# Patient Record
Sex: FEMALE | Race: BLACK OR AFRICAN AMERICAN | NOT HISPANIC OR LATINO
[De-identification: names, ages, dates, MRNs, and addresses within clinical notes are randomized per-mention and may not be internally consistent; named-entity substitution may affect disease eponyms.]

---

## 2018-05-28 ENCOUNTER — TRANSCRIPTION ENCOUNTER (OUTPATIENT)
Age: 23
End: 2018-05-28

## 2019-06-13 ENCOUNTER — APPOINTMENT (OUTPATIENT)
Dept: OBGYN | Facility: CLINIC | Age: 24
End: 2019-06-13

## 2019-12-18 ENCOUNTER — TRANSCRIPTION ENCOUNTER (OUTPATIENT)
Age: 24
End: 2019-12-18

## 2020-01-07 ENCOUNTER — TRANSCRIPTION ENCOUNTER (OUTPATIENT)
Age: 25
End: 2020-01-07

## 2021-03-28 ENCOUNTER — TRANSCRIPTION ENCOUNTER (OUTPATIENT)
Age: 26
End: 2021-03-28

## 2021-04-22 ENCOUNTER — TRANSCRIPTION ENCOUNTER (OUTPATIENT)
Age: 26
End: 2021-04-22

## 2021-07-30 ENCOUNTER — TRANSCRIPTION ENCOUNTER (OUTPATIENT)
Age: 26
End: 2021-07-30

## 2021-09-05 ENCOUNTER — TRANSCRIPTION ENCOUNTER (OUTPATIENT)
Age: 26
End: 2021-09-05

## 2021-11-04 ENCOUNTER — ASOB RESULT (OUTPATIENT)
Age: 26
End: 2021-11-04

## 2021-11-04 ENCOUNTER — APPOINTMENT (OUTPATIENT)
Dept: OBGYN | Facility: CLINIC | Age: 26
End: 2021-11-04
Payer: COMMERCIAL

## 2021-11-04 VITALS
DIASTOLIC BLOOD PRESSURE: 85 MMHG | SYSTOLIC BLOOD PRESSURE: 143 MMHG | WEIGHT: 130.44 LBS | HEIGHT: 58 IN | BODY MASS INDEX: 27.38 KG/M2 | HEART RATE: 86 BPM

## 2021-11-04 VITALS — SYSTOLIC BLOOD PRESSURE: 129 MMHG | DIASTOLIC BLOOD PRESSURE: 85 MMHG

## 2021-11-04 DIAGNOSIS — Z78.9 OTHER SPECIFIED HEALTH STATUS: ICD-10-CM

## 2021-11-04 DIAGNOSIS — Z87.898 PERSONAL HISTORY OF OTHER SPECIFIED CONDITIONS: ICD-10-CM

## 2021-11-04 DIAGNOSIS — Z83.3 FAMILY HISTORY OF DIABETES MELLITUS: ICD-10-CM

## 2021-11-04 PROCEDURE — 99203 OFFICE O/P NEW LOW 30 MIN: CPT

## 2021-11-04 PROCEDURE — 76817 TRANSVAGINAL US OBSTETRIC: CPT

## 2021-11-07 PROBLEM — Z87.898 HISTORY OF MARIJUANA USE: Status: ACTIVE | Noted: 2021-11-07

## 2021-11-07 PROBLEM — Z78.9 DOES NOT USE ILLICIT DRUGS: Status: ACTIVE | Noted: 2021-11-07

## 2021-11-07 PROBLEM — Z78.9 SOCIAL ALCOHOL USE: Status: ACTIVE | Noted: 2021-11-07

## 2021-11-07 PROBLEM — Z83.3 FAMILY HISTORY OF DIABETES MELLITUS: Status: ACTIVE | Noted: 2021-11-07

## 2021-11-07 LAB
ABO + RH PNL BLD: NORMAL
B19V IGG SER QL IA: 6.25 INDEX
B19V IGG+IGM SER-IMP: NORMAL
B19V IGG+IGM SER-IMP: POSITIVE
B19V IGM FLD-ACNC: 0.08 INDEX
B19V IGM SER-ACNC: NEGATIVE
BACTERIA UR CULT: NORMAL
BASOPHILS # BLD AUTO: 0.03 K/UL
BASOPHILS NFR BLD AUTO: 0.3 %
BLD GP AB SCN SERPL QL: NORMAL
C TRACH RRNA SPEC QL NAA+PROBE: NOT DETECTED
CMV IGG SERPL QL: <0.2 U/ML
CMV IGG SERPL-IMP: NEGATIVE
CMV IGM SERPL QL: <8 AU/ML
CMV IGM SERPL QL: NEGATIVE
EOSINOPHIL # BLD AUTO: 0.09 K/UL
EOSINOPHIL NFR BLD AUTO: 0.8 %
HBV SURFACE AG SER QL: NONREACTIVE
HCT VFR BLD CALC: 38.2 %
HCV AB SER QL: NONREACTIVE
HCV S/CO RATIO: 0.11 S/CO
HGB A MFR BLD: 97.2 %
HGB A2 MFR BLD: 2.8 %
HGB BLD-MCNC: 12.6 G/DL
HGB FRACT BLD-IMP: NORMAL
HIV1+2 AB SPEC QL IA.RAPID: NONREACTIVE
IMM GRANULOCYTES NFR BLD AUTO: 0.3 %
LEAD BLD-MCNC: <1 UG/DL
LYMPHOCYTES # BLD AUTO: 1.93 K/UL
LYMPHOCYTES NFR BLD AUTO: 17.5 %
MAN DIFF?: NORMAL
MCHC RBC-ENTMCNC: 30.6 PG
MCHC RBC-ENTMCNC: 33 GM/DL
MCV RBC AUTO: 92.7 FL
MONOCYTES # BLD AUTO: 0.67 K/UL
MONOCYTES NFR BLD AUTO: 6.1 %
N GONORRHOEA RRNA SPEC QL NAA+PROBE: NOT DETECTED
NEUTROPHILS # BLD AUTO: 8.31 K/UL
NEUTROPHILS NFR BLD AUTO: 75 %
PLATELET # BLD AUTO: 344 K/UL
RBC # BLD: 4.12 M/UL
RBC # FLD: 12.4 %
RUBV IGG FLD-ACNC: 7.2 INDEX
RUBV IGG SER-IMP: POSITIVE
SOURCE AMPLIFICATION: NORMAL
T PALLIDUM AB SER QL IA: NEGATIVE
VZV AB TITR SER: POSITIVE
VZV IGG SER IF-ACNC: 1107 INDEX
WBC # FLD AUTO: 11.06 K/UL

## 2021-11-07 RX ORDER — PNV NO.95/FERROUS FUM/FOLIC AC 28MG-0.8MG
TABLET ORAL
Refills: 0 | Status: ACTIVE | COMMUNITY

## 2021-11-15 LAB
AR GENE MUT ANL BLD/T: NORMAL
CFTR MUT TESTED BLD/T: NEGATIVE
FMR1 GENE MUT ANL BLD/T: NORMAL

## 2021-12-10 ENCOUNTER — APPOINTMENT (OUTPATIENT)
Dept: ANTEPARTUM | Facility: CLINIC | Age: 26
End: 2021-12-10
Payer: COMMERCIAL

## 2021-12-10 ENCOUNTER — APPOINTMENT (OUTPATIENT)
Dept: OBGYN | Facility: CLINIC | Age: 26
End: 2021-12-10

## 2021-12-10 ENCOUNTER — ASOB RESULT (OUTPATIENT)
Age: 26
End: 2021-12-10

## 2021-12-10 ENCOUNTER — LABORATORY RESULT (OUTPATIENT)
Age: 26
End: 2021-12-10

## 2021-12-10 VITALS
WEIGHT: 125 LBS | TEMPERATURE: 97.1 F | HEIGHT: 58 IN | HEART RATE: 85 BPM | DIASTOLIC BLOOD PRESSURE: 79 MMHG | SYSTOLIC BLOOD PRESSURE: 125 MMHG | BODY MASS INDEX: 26.24 KG/M2

## 2021-12-10 PROCEDURE — 76813 OB US NUCHAL MEAS 1 GEST: CPT

## 2021-12-10 PROCEDURE — 76801 OB US < 14 WKS SINGLE FETUS: CPT

## 2021-12-10 PROCEDURE — 36416 COLLJ CAPILLARY BLOOD SPEC: CPT

## 2022-01-13 ENCOUNTER — APPOINTMENT (OUTPATIENT)
Dept: OBGYN | Facility: CLINIC | Age: 27
End: 2022-01-13

## 2022-01-13 VITALS
HEIGHT: 58 IN | WEIGHT: 127 LBS | HEART RATE: 93 BPM | DIASTOLIC BLOOD PRESSURE: 79 MMHG | SYSTOLIC BLOOD PRESSURE: 119 MMHG | BODY MASS INDEX: 26.66 KG/M2 | TEMPERATURE: 97.5 F

## 2022-01-23 LAB
1ST TRIMESTER DATA: NORMAL
2ND TRIMESTER DATA: NORMAL
AFP PNL SERPL: NORMAL
AFP SERPL-ACNC: NORMAL
AFP SERPL-ACNC: NORMAL
B-HCG FREE SERPL-MCNC: NORMAL
CLINICAL BIOCHEMIST REVIEW: NORMAL
FREE BETA HCG 1ST TRIMESTER: NORMAL
INHIBIN A SERPL-MCNC: NORMAL
NOTES NTD: NORMAL
NT: NORMAL
PAPP-A SERPL-ACNC: NORMAL
U ESTRIOL SERPL-SCNC: NORMAL

## 2022-02-10 ENCOUNTER — APPOINTMENT (OUTPATIENT)
Dept: ANTEPARTUM | Facility: CLINIC | Age: 27
End: 2022-02-10
Payer: COMMERCIAL

## 2022-02-10 ENCOUNTER — ASOB RESULT (OUTPATIENT)
Age: 27
End: 2022-02-10

## 2022-02-10 PROCEDURE — 76811 OB US DETAILED SNGL FETUS: CPT

## 2022-02-17 ENCOUNTER — NON-APPOINTMENT (OUTPATIENT)
Age: 27
End: 2022-02-17

## 2022-02-17 ENCOUNTER — APPOINTMENT (OUTPATIENT)
Dept: OBGYN | Facility: CLINIC | Age: 27
End: 2022-02-17

## 2022-02-17 VITALS
DIASTOLIC BLOOD PRESSURE: 78 MMHG | BODY MASS INDEX: 28.13 KG/M2 | WEIGHT: 134 LBS | HEIGHT: 58 IN | HEART RATE: 92 BPM | SYSTOLIC BLOOD PRESSURE: 122 MMHG

## 2022-03-15 ENCOUNTER — APPOINTMENT (OUTPATIENT)
Dept: OBGYN | Facility: CLINIC | Age: 27
End: 2022-03-15
Payer: COMMERCIAL

## 2022-03-15 VITALS
DIASTOLIC BLOOD PRESSURE: 77 MMHG | HEIGHT: 58 IN | BODY MASS INDEX: 28.97 KG/M2 | WEIGHT: 138 LBS | SYSTOLIC BLOOD PRESSURE: 114 MMHG | HEART RATE: 84 BPM

## 2022-03-15 DIAGNOSIS — B96.89 ACUTE VAGINITIS: ICD-10-CM

## 2022-03-15 DIAGNOSIS — N76.0 ACUTE VAGINITIS: ICD-10-CM

## 2022-03-15 PROCEDURE — 99213 OFFICE O/P EST LOW 20 MIN: CPT | Mod: 25

## 2022-03-15 PROCEDURE — 0502F SUBSEQUENT PRENATAL CARE: CPT

## 2022-03-16 ENCOUNTER — NON-APPOINTMENT (OUTPATIENT)
Age: 27
End: 2022-03-16

## 2022-03-16 PROBLEM — N76.0 BACTERIAL VAGINOSIS: Status: RESOLVED | Noted: 2022-03-16 | Resolved: 2022-04-15

## 2022-03-16 LAB
CANDIDA VAG CYTO: NOT DETECTED
G VAGINALIS+PREV SP MTYP VAG QL MICRO: DETECTED
T VAGINALIS VAG QL WET PREP: NOT DETECTED

## 2022-03-17 ENCOUNTER — APPOINTMENT (OUTPATIENT)
Dept: OBGYN | Facility: CLINIC | Age: 27
End: 2022-03-17

## 2022-03-17 VITALS
BODY MASS INDEX: 28.97 KG/M2 | HEART RATE: 97 BPM | WEIGHT: 138 LBS | DIASTOLIC BLOOD PRESSURE: 73 MMHG | HEIGHT: 58 IN | SYSTOLIC BLOOD PRESSURE: 112 MMHG

## 2022-03-17 LAB — BACTERIA UR CULT: NORMAL

## 2022-03-30 ENCOUNTER — NON-APPOINTMENT (OUTPATIENT)
Age: 27
End: 2022-03-30

## 2022-03-30 ENCOUNTER — APPOINTMENT (OUTPATIENT)
Dept: OBGYN | Facility: CLINIC | Age: 27
End: 2022-03-30
Payer: COMMERCIAL

## 2022-03-30 VITALS
HEIGHT: 58 IN | WEIGHT: 139 LBS | HEART RATE: 94 BPM | BODY MASS INDEX: 29.18 KG/M2 | SYSTOLIC BLOOD PRESSURE: 113 MMHG | DIASTOLIC BLOOD PRESSURE: 73 MMHG

## 2022-03-30 VITALS — HEIGHT: 58 IN

## 2022-03-30 PROCEDURE — 0502F SUBSEQUENT PRENATAL CARE: CPT

## 2022-03-30 PROCEDURE — 99213 OFFICE O/P EST LOW 20 MIN: CPT | Mod: 25

## 2022-03-31 DIAGNOSIS — B37.3 CANDIDIASIS OF VULVA AND VAGINA: ICD-10-CM

## 2022-03-31 LAB
CANDIDA VAG CYTO: DETECTED
G VAGINALIS+PREV SP MTYP VAG QL MICRO: DETECTED
T VAGINALIS VAG QL WET PREP: NOT DETECTED

## 2022-04-01 ENCOUNTER — TRANSCRIPTION ENCOUNTER (OUTPATIENT)
Age: 27
End: 2022-04-01

## 2022-04-12 ENCOUNTER — NON-APPOINTMENT (OUTPATIENT)
Age: 27
End: 2022-04-12

## 2022-04-12 ENCOUNTER — APPOINTMENT (OUTPATIENT)
Dept: OBGYN | Facility: CLINIC | Age: 27
End: 2022-04-12

## 2022-04-12 VITALS
SYSTOLIC BLOOD PRESSURE: 113 MMHG | HEART RATE: 96 BPM | WEIGHT: 140 LBS | DIASTOLIC BLOOD PRESSURE: 76 MMHG | HEIGHT: 58 IN | BODY MASS INDEX: 29.39 KG/M2

## 2022-04-12 LAB
BASOPHILS # BLD AUTO: 0.03 K/UL
BASOPHILS NFR BLD AUTO: 0.3 %
EOSINOPHIL # BLD AUTO: 0.07 K/UL
EOSINOPHIL NFR BLD AUTO: 0.8 %
GLUCOSE 1H P 50 G GLC PO SERPL-MCNC: 84 MG/DL
HCT VFR BLD CALC: 32.5 %
HGB BLD-MCNC: 10.3 G/DL
IMM GRANULOCYTES NFR BLD AUTO: 0.6 %
LYMPHOCYTES # BLD AUTO: 1.38 K/UL
LYMPHOCYTES NFR BLD AUTO: 15.4 %
MAN DIFF?: NORMAL
MCHC RBC-ENTMCNC: 30 PG
MCHC RBC-ENTMCNC: 31.7 GM/DL
MCV RBC AUTO: 94.8 FL
MONOCYTES # BLD AUTO: 0.65 K/UL
MONOCYTES NFR BLD AUTO: 7.2 %
NEUTROPHILS # BLD AUTO: 6.81 K/UL
NEUTROPHILS NFR BLD AUTO: 75.7 %
PLATELET # BLD AUTO: 235 K/UL
RBC # BLD: 3.43 M/UL
RBC # FLD: 14.2 %
T PALLIDUM AB SER QL IA: NEGATIVE
WBC # FLD AUTO: 8.99 K/UL

## 2022-04-13 ENCOUNTER — TRANSCRIPTION ENCOUNTER (OUTPATIENT)
Age: 27
End: 2022-04-13

## 2022-04-25 ENCOUNTER — NON-APPOINTMENT (OUTPATIENT)
Age: 27
End: 2022-04-25

## 2022-04-25 ENCOUNTER — APPOINTMENT (OUTPATIENT)
Dept: OBGYN | Facility: CLINIC | Age: 27
End: 2022-04-25

## 2022-04-25 ENCOUNTER — INPATIENT (INPATIENT)
Facility: HOSPITAL | Age: 27
LOS: 0 days | Discharge: HOME | End: 2022-04-26
Attending: OBSTETRICS & GYNECOLOGY | Admitting: OBSTETRICS & GYNECOLOGY
Payer: COMMERCIAL

## 2022-04-25 VITALS
TEMPERATURE: 98 F | HEART RATE: 92 BPM | DIASTOLIC BLOOD PRESSURE: 55 MMHG | RESPIRATION RATE: 16 BRPM | SYSTOLIC BLOOD PRESSURE: 91 MMHG

## 2022-04-25 DIAGNOSIS — Z87.42 PERSONAL HISTORY OF OTHER DISEASES OF THE FEMALE GENITAL TRACT: Chronic | ICD-10-CM

## 2022-04-25 DIAGNOSIS — Z3A.00 WEEKS OF GESTATION OF PREGNANCY NOT SPECIFIED: ICD-10-CM

## 2022-04-25 DIAGNOSIS — O26.899 OTHER SPECIFIED PREGNANCY RELATED CONDITIONS, UNSPECIFIED TRIMESTER: ICD-10-CM

## 2022-04-25 LAB
APPEARANCE UR: CLEAR — SIGNIFICANT CHANGE UP
BACTERIA # UR AUTO: NEGATIVE — SIGNIFICANT CHANGE UP
BILIRUB UR-MCNC: NEGATIVE — SIGNIFICANT CHANGE UP
COLOR SPEC: COLORLESS — SIGNIFICANT CHANGE UP
DIFF PNL FLD: NEGATIVE — SIGNIFICANT CHANGE UP
EPI CELLS # UR: 2 /HPF — SIGNIFICANT CHANGE UP (ref 0–5)
FIBRONECTIN FETAL SPEC QL: NEGATIVE — SIGNIFICANT CHANGE UP
GLUCOSE UR QL: NEGATIVE — SIGNIFICANT CHANGE UP
HYALINE CASTS # UR AUTO: 1 /LPF — SIGNIFICANT CHANGE UP (ref 0–7)
KETONES UR-MCNC: NEGATIVE — SIGNIFICANT CHANGE UP
LEUKOCYTE ESTERASE UR-ACNC: ABNORMAL
NITRITE UR-MCNC: NEGATIVE — SIGNIFICANT CHANGE UP
PH UR: 6.5 — SIGNIFICANT CHANGE UP (ref 5–8)
PROT UR-MCNC: NEGATIVE — SIGNIFICANT CHANGE UP
RBC CASTS # UR COMP ASSIST: 0 /HPF — SIGNIFICANT CHANGE UP (ref 0–4)
SP GR SPEC: 1.01 — SIGNIFICANT CHANGE UP (ref 1–1.05)
UROBILINOGEN FLD QL: SIGNIFICANT CHANGE UP
WBC UR QL: 5 /HPF — SIGNIFICANT CHANGE UP (ref 0–5)

## 2022-04-25 PROCEDURE — 99203 OFFICE O/P NEW LOW 30 MIN: CPT

## 2022-04-25 RX ORDER — SODIUM CHLORIDE 9 MG/ML
1000 INJECTION, SOLUTION INTRAVENOUS ONCE
Refills: 0 | Status: COMPLETED | OUTPATIENT
Start: 2022-04-25 | End: 2022-04-25

## 2022-04-25 RX ADMIN — SODIUM CHLORIDE 1000 MILLILITER(S): 9 INJECTION, SOLUTION INTRAVENOUS at 21:17

## 2022-04-25 NOTE — OB RN TRIAGE NOTE - FALL HARM RISK - UNIVERSAL INTERVENTIONS
Bed in lowest position, wheels locked, appropriate side rails in place/Call bell, personal items and telephone in reach/Instruct patient to call for assistance before getting out of bed or chair/Non-slip footwear when patient is out of bed/[place] to call system/Physically safe environment - no spills, clutter or unnecessary equipment/Purposeful Proactive Rounding/Room/bathroom lighting operational, light cord in reach

## 2022-04-25 NOTE — OB PROVIDER TRIAGE NOTE - ADDITIONAL INSTRUCTIONS
return with any concerns and or change in symptoms such as worsened or more frequent contractions  follow up with OB this Thursday as scheduled

## 2022-04-25 NOTE — OB RN TRIAGE NOTE - NSICDXPASTMEDICALHX_GEN_ALL_CORE_FT
PAST MEDICAL HISTORY:  BV (bacterial vaginosis)     No pertinent past medical history     Yeast infection

## 2022-04-25 NOTE — OB PROVIDER TRIAGE NOTE - NS_VAGBLEEDCOLOR_OBGYN_ALL_OB
Patient has been experiencing left sided lower quadrant abdominal pain x 3 days. Rates pain 6/10 and pain is \"easing up\". Pain improves with sitting and worsens with activity. Last bowel movement was 2 days ago, which is normal for patient. Denies any blood in stool. Patient does have a history of colitis and sees [facility]. Writer advised patient contact her GI provider at [facility] for follow up. Patient verbalized understanding and declined further questions at this time.     N/A

## 2022-04-25 NOTE — OB PROVIDER TRIAGE NOTE - HISTORY OF PRESENT ILLNESS
27 y/o female  at 31 GA with SLIUP presents to rule out  labor. Pt c/o contractions since 1:45pm today rated at 5/10 in pain severity. She states the contractions are irregular and originally occurred 3-4 times per hour but for the past ~2 hours, the contractions happened only a few times in total. Pt reports positive FM. Pt denies LOF, VB, urinary symptoms.  At 29GA, baby measured 33 GA - pt scheduled for follow-up this week.  Pt states she had BV and yeast infex 3/15/22, she completed her medication, but she has since still experienced viaginal irritation.  otherwise uncomplicated PNC.  Pt reports recent travel 22 to [place].    med hx: denies  surgical hx: D&C 2019  Ob hx: 2019 - TOP, D&C  Gyn hx: denies  Allergies: neosporin--> rash  medication: PNV, Vit C, probiotics  Psych: denies  Social: denies

## 2022-04-25 NOTE — OB PROVIDER TRIAGE NOTE - NSOBPROVIDERNOTE_OBGYN_ALL_OB_FT
25 y/o female  at 31 GA with SLIUP presents to rule out  labor after c/o contractions since 1:45pm today.  exam at 21:30:        TVS - cervix length 2.02-2.45cm. no funneling/bulging/dynamic changes        SVE - closed       EFM- reassuring        [place] - irreg contractions, Pt feels them a few times per hour  Re-examine in 2 hours at 23:30 to check for cervical changes  IV fluid 1000ml   sent UA, Ucx, FFN  performed GBS- hold for now    d/w Dr Loco --> continue to monitor for 2 hours and then re-examine cervix. 25 y/o female  at 31 GA with SLIUP presents to rule out  labor after c/o contractions since 1:45pm today.  exam at 21:30:        TVS - cervix length 2.02-2.45cm. no funneling/bulging/dynamic changes        SVE - closed       EFM- reassuring        [place] - irreg contractions, Pt feels them a few times per hour  Re-examine in 2 hours at 23:30 to check for cervical changes  IV fluid 1000ml   UA - mod leuks, Ucx pending, FFN - negative  performed GBS- hold for now    d/w Dr Loco --> continue to monitor for 2 hours and then re-examine cervix.  re-examined pt at around 11:45 pm --> cervix closed, unchanged  Pt still khoa irregularly, she feels some of them - tolerable  d/w Dr. Loco --> discharge pt home  explained to patient  labor forms. pt verbalized understanding to return with worsened/more frequent contractions and to f/u with [facility] provider this Thursday as scheduled.

## 2022-04-25 NOTE — OB PROVIDER TRIAGE NOTE - NSHPPHYSICALEXAM_GEN_ALL_CORE
Vital Signs Last 24 Hrs  T(C): --  T(F): --  HR: 92 (25 Apr 2022 19:42) (92 - 92)  BP: 91/55 (25 Apr 2022 19:42) (91/55 - 91/55)  BP(mean): --  RR: --  SpO2: --    gen: a/o x 3  pulm: CTA b/l  cardio: RRR  abd: soft, nontender. Gravid  SSE: FFN. GBS - hold for now. closed cervix. no evidence bleeding, abnormal discharge and or fluid/pooling.  TVS: no evidence funneling, bulging and or dynamic changes. cervix length - 2.02-2.45cm.   SVE: closed cervix  EFM: baseline 145 bpm, mod variability, pos accels, variable decels - reassuring tracing  [place]: irregular contractions Vital Signs Last 24 Hrs  T(C): --  T(F): --  HR: 92 (25 Apr 2022 19:42) (92 - 92)  BP: 91/55 (25 Apr 2022 19:42) (91/55 - 91/55)  BP(mean): --  RR: --  SpO2: --    gen: a/o x 3  pulm: CTA b/l  cardio: RRR  abd: soft, nontender. Gravid  SSE: FFN. GBS - hold for now. closed cervix. no evidence bleeding, abnormal discharge and or fluid/pooling.  TVS: no evidence funneling, bulging and or dynamic changes. cervix length - 2.02-2.45cm.   SVE: 9:30pm - closed cervix         ~11:45pm - cervix closed  EFM: baseline 145 bpm, mod variability, pos accels, variable decels - reassuring tracing  [place]: irregular contractions

## 2022-04-26 ENCOUNTER — NON-APPOINTMENT (OUTPATIENT)
Age: 27
End: 2022-04-26

## 2022-04-26 VITALS — DIASTOLIC BLOOD PRESSURE: 55 MMHG | SYSTOLIC BLOOD PRESSURE: 107 MMHG | HEART RATE: 98 BPM

## 2022-04-26 DIAGNOSIS — O34.30 MATERNAL CARE FOR CERVICAL INCOMPETENCE, UNSPECIFIED TRIMESTER: ICD-10-CM

## 2022-04-27 ENCOUNTER — APPOINTMENT (OUTPATIENT)
Dept: ANTEPARTUM | Facility: CLINIC | Age: 27
End: 2022-04-27
Payer: COMMERCIAL

## 2022-04-27 ENCOUNTER — APPOINTMENT (OUTPATIENT)
Dept: OBGYN | Facility: CLINIC | Age: 27
End: 2022-04-27

## 2022-04-27 ENCOUNTER — ASOB RESULT (OUTPATIENT)
Age: 27
End: 2022-04-27

## 2022-04-27 VITALS
SYSTOLIC BLOOD PRESSURE: 121 MMHG | HEIGHT: 58 IN | BODY MASS INDEX: 30.02 KG/M2 | WEIGHT: 143 LBS | DIASTOLIC BLOOD PRESSURE: 77 MMHG

## 2022-04-27 LAB
CULTURE RESULTS: SIGNIFICANT CHANGE UP
SPECIMEN SOURCE: SIGNIFICANT CHANGE UP

## 2022-04-27 PROCEDURE — 76816 OB US FOLLOW-UP PER FETUS: CPT

## 2022-04-27 PROCEDURE — 76819 FETAL BIOPHYS PROFIL W/O NST: CPT

## 2022-05-12 ENCOUNTER — MED ADMIN CHARGE (OUTPATIENT)
Age: 27
End: 2022-05-12

## 2022-05-12 ENCOUNTER — APPOINTMENT (OUTPATIENT)
Dept: OBGYN | Facility: CLINIC | Age: 27
End: 2022-05-12
Payer: COMMERCIAL

## 2022-05-12 ENCOUNTER — NON-APPOINTMENT (OUTPATIENT)
Age: 27
End: 2022-05-12

## 2022-05-12 VITALS
WEIGHT: 144 LBS | BODY MASS INDEX: 30.23 KG/M2 | SYSTOLIC BLOOD PRESSURE: 110 MMHG | DIASTOLIC BLOOD PRESSURE: 73 MMHG | HEART RATE: 91 BPM | HEIGHT: 58 IN

## 2022-05-12 PROCEDURE — 0502F SUBSEQUENT PRENATAL CARE: CPT

## 2022-05-26 ENCOUNTER — NON-APPOINTMENT (OUTPATIENT)
Age: 27
End: 2022-05-26

## 2022-05-26 ENCOUNTER — APPOINTMENT (OUTPATIENT)
Dept: OBGYN | Facility: CLINIC | Age: 27
End: 2022-05-26

## 2022-05-26 VITALS
HEIGHT: 58 IN | DIASTOLIC BLOOD PRESSURE: 77 MMHG | HEART RATE: 94 BPM | WEIGHT: 144 LBS | BODY MASS INDEX: 30.23 KG/M2 | SYSTOLIC BLOOD PRESSURE: 114 MMHG

## 2022-06-02 ENCOUNTER — APPOINTMENT (OUTPATIENT)
Dept: OBGYN | Facility: CLINIC | Age: 27
End: 2022-06-02

## 2022-06-09 ENCOUNTER — APPOINTMENT (OUTPATIENT)
Dept: OBGYN | Facility: CLINIC | Age: 27
End: 2022-06-09

## 2022-06-09 VITALS
HEART RATE: 92 BPM | WEIGHT: 148.19 LBS | BODY MASS INDEX: 31.11 KG/M2 | HEIGHT: 58 IN | SYSTOLIC BLOOD PRESSURE: 121 MMHG | DIASTOLIC BLOOD PRESSURE: 77 MMHG

## 2022-06-16 ENCOUNTER — APPOINTMENT (OUTPATIENT)
Dept: OBGYN | Facility: CLINIC | Age: 27
End: 2022-06-16
Payer: COMMERCIAL

## 2022-06-16 VITALS
WEIGHT: 148.38 LBS | HEIGHT: 58 IN | BODY MASS INDEX: 31.14 KG/M2 | DIASTOLIC BLOOD PRESSURE: 83 MMHG | SYSTOLIC BLOOD PRESSURE: 118 MMHG

## 2022-06-16 PROCEDURE — 0502F SUBSEQUENT PRENATAL CARE: CPT

## 2022-06-20 ENCOUNTER — INPATIENT (INPATIENT)
Facility: HOSPITAL | Age: 27
LOS: 1 days | Discharge: HOME | End: 2022-06-22
Attending: OBSTETRICS & GYNECOLOGY | Admitting: OBSTETRICS & GYNECOLOGY
Payer: COMMERCIAL

## 2022-06-20 ENCOUNTER — TRANSCRIPTION ENCOUNTER (OUTPATIENT)
Age: 27
End: 2022-06-20

## 2022-06-20 VITALS
TEMPERATURE: 98 F | DIASTOLIC BLOOD PRESSURE: 83 MMHG | SYSTOLIC BLOOD PRESSURE: 125 MMHG | HEART RATE: 98 BPM | RESPIRATION RATE: 16 BRPM

## 2022-06-20 DIAGNOSIS — Z87.42 PERSONAL HISTORY OF OTHER DISEASES OF THE FEMALE GENITAL TRACT: Chronic | ICD-10-CM

## 2022-06-20 DIAGNOSIS — Z3A.00 WEEKS OF GESTATION OF PREGNANCY NOT SPECIFIED: ICD-10-CM

## 2022-06-20 DIAGNOSIS — O26.899 OTHER SPECIFIED PREGNANCY RELATED CONDITIONS, UNSPECIFIED TRIMESTER: ICD-10-CM

## 2022-06-20 PROBLEM — B37.9 CANDIDIASIS, UNSPECIFIED: Chronic | Status: ACTIVE | Noted: 2022-04-25

## 2022-06-20 PROBLEM — N76.0 ACUTE VAGINITIS: Chronic | Status: ACTIVE | Noted: 2022-04-25

## 2022-06-20 LAB
BASOPHILS # BLD AUTO: 0.02 K/UL — SIGNIFICANT CHANGE UP (ref 0–0.2)
BASOPHILS NFR BLD AUTO: 0.2 % — SIGNIFICANT CHANGE UP (ref 0–2)
BLD GP AB SCN SERPL QL: NEGATIVE — SIGNIFICANT CHANGE UP
EOSINOPHIL # BLD AUTO: 0.01 K/UL — SIGNIFICANT CHANGE UP (ref 0–0.5)
EOSINOPHIL NFR BLD AUTO: 0.1 % — SIGNIFICANT CHANGE UP (ref 0–6)
HCT VFR BLD CALC: 34.4 % — LOW (ref 34.5–45)
HGB BLD-MCNC: 11.2 G/DL — LOW (ref 11.5–15.5)
IANC: 8.51 K/UL — HIGH (ref 1.8–7.4)
IMM GRANULOCYTES NFR BLD AUTO: 0.3 % — SIGNIFICANT CHANGE UP (ref 0–1.5)
LYMPHOCYTES # BLD AUTO: 1.16 K/UL — SIGNIFICANT CHANGE UP (ref 1–3.3)
LYMPHOCYTES # BLD AUTO: 11.1 % — LOW (ref 13–44)
MCHC RBC-ENTMCNC: 29.2 PG — SIGNIFICANT CHANGE UP (ref 27–34)
MCHC RBC-ENTMCNC: 32.6 GM/DL — SIGNIFICANT CHANGE UP (ref 32–36)
MCV RBC AUTO: 89.8 FL — SIGNIFICANT CHANGE UP (ref 80–100)
MONOCYTES # BLD AUTO: 0.74 K/UL — SIGNIFICANT CHANGE UP (ref 0–0.9)
MONOCYTES NFR BLD AUTO: 7.1 % — SIGNIFICANT CHANGE UP (ref 2–14)
NEUTROPHILS # BLD AUTO: 8.51 K/UL — HIGH (ref 1.8–7.4)
NEUTROPHILS NFR BLD AUTO: 81.2 % — HIGH (ref 43–77)
NRBC # BLD: 0 /100 WBCS — SIGNIFICANT CHANGE UP
NRBC # FLD: 0 K/UL — SIGNIFICANT CHANGE UP
PLATELET # BLD AUTO: 187 K/UL — SIGNIFICANT CHANGE UP (ref 150–400)
RBC # BLD: 3.83 M/UL — SIGNIFICANT CHANGE UP (ref 3.8–5.2)
RBC # FLD: 14.3 % — SIGNIFICANT CHANGE UP (ref 10.3–14.5)
RH IG SCN BLD-IMP: POSITIVE — SIGNIFICANT CHANGE UP
RH IG SCN BLD-IMP: POSITIVE — SIGNIFICANT CHANGE UP
SARS-COV-2 RNA SPEC QL NAA+PROBE: SIGNIFICANT CHANGE UP
WBC # BLD: 10.47 K/UL — SIGNIFICANT CHANGE UP (ref 3.8–10.5)
WBC # FLD AUTO: 10.47 K/UL — SIGNIFICANT CHANGE UP (ref 3.8–10.5)

## 2022-06-20 PROCEDURE — 59400 OBSTETRICAL CARE: CPT | Mod: U9,UB,GC

## 2022-06-20 RX ORDER — OXYTOCIN 10 UNIT/ML
333.33 VIAL (ML) INJECTION
Qty: 20 | Refills: 0 | Status: DISCONTINUED | OUTPATIENT
Start: 2022-06-20 | End: 2022-06-21

## 2022-06-20 RX ORDER — OXYTOCIN 10 UNIT/ML
333.33 VIAL (ML) INJECTION
Qty: 20 | Refills: 0 | Status: DISCONTINUED | OUTPATIENT
Start: 2022-06-20 | End: 2022-06-20

## 2022-06-20 RX ORDER — BENZOCAINE 10 %
1 GEL (GRAM) MUCOUS MEMBRANE EVERY 6 HOURS
Refills: 0 | Status: DISCONTINUED | OUTPATIENT
Start: 2022-06-20 | End: 2022-06-22

## 2022-06-20 RX ORDER — SIMETHICONE 80 MG/1
80 TABLET, CHEWABLE ORAL EVERY 4 HOURS
Refills: 0 | Status: DISCONTINUED | OUTPATIENT
Start: 2022-06-20 | End: 2022-06-22

## 2022-06-20 RX ORDER — LANOLIN
1 OINTMENT (GRAM) TOPICAL EVERY 6 HOURS
Refills: 0 | Status: DISCONTINUED | OUTPATIENT
Start: 2022-06-20 | End: 2022-06-22

## 2022-06-20 RX ORDER — OXYCODONE HYDROCHLORIDE 5 MG/1
5 TABLET ORAL ONCE
Refills: 0 | Status: DISCONTINUED | OUTPATIENT
Start: 2022-06-20 | End: 2022-06-22

## 2022-06-20 RX ORDER — TETANUS TOXOID, REDUCED DIPHTHERIA TOXOID AND ACELLULAR PERTUSSIS VACCINE, ADSORBED 5; 2.5; 8; 8; 2.5 [IU]/.5ML; [IU]/.5ML; UG/.5ML; UG/.5ML; UG/.5ML
0.5 SUSPENSION INTRAMUSCULAR ONCE
Refills: 0 | Status: DISCONTINUED | OUTPATIENT
Start: 2022-06-20 | End: 2022-06-22

## 2022-06-20 RX ORDER — PRAMOXINE HYDROCHLORIDE 150 MG/15G
1 AEROSOL, FOAM RECTAL EVERY 4 HOURS
Refills: 0 | Status: DISCONTINUED | OUTPATIENT
Start: 2022-06-20 | End: 2022-06-22

## 2022-06-20 RX ORDER — IBUPROFEN 200 MG
600 TABLET ORAL EVERY 6 HOURS
Refills: 0 | Status: COMPLETED | OUTPATIENT
Start: 2022-06-20 | End: 2023-05-19

## 2022-06-20 RX ORDER — OXYCODONE HYDROCHLORIDE 5 MG/1
5 TABLET ORAL
Refills: 0 | Status: DISCONTINUED | OUTPATIENT
Start: 2022-06-20 | End: 2022-06-22

## 2022-06-20 RX ORDER — HYDROCORTISONE 1 %
1 OINTMENT (GRAM) TOPICAL EVERY 6 HOURS
Refills: 0 | Status: DISCONTINUED | OUTPATIENT
Start: 2022-06-20 | End: 2022-06-22

## 2022-06-20 RX ORDER — SODIUM CHLORIDE 9 MG/ML
1000 INJECTION, SOLUTION INTRAVENOUS
Refills: 0 | Status: DISCONTINUED | OUTPATIENT
Start: 2022-06-20 | End: 2022-06-20

## 2022-06-20 RX ORDER — AER TRAVELER 0.5 G/1
1 SOLUTION RECTAL; TOPICAL EVERY 4 HOURS
Refills: 0 | Status: DISCONTINUED | OUTPATIENT
Start: 2022-06-20 | End: 2022-06-22

## 2022-06-20 RX ORDER — ONDANSETRON 8 MG/1
4 TABLET, FILM COATED ORAL ONCE
Refills: 0 | Status: DISCONTINUED | OUTPATIENT
Start: 2022-06-20 | End: 2022-06-22

## 2022-06-20 RX ORDER — ACETAMINOPHEN 500 MG
975 TABLET ORAL
Refills: 0 | Status: DISCONTINUED | OUTPATIENT
Start: 2022-06-20 | End: 2022-06-22

## 2022-06-20 RX ORDER — KETOROLAC TROMETHAMINE 30 MG/ML
30 SYRINGE (ML) INJECTION ONCE
Refills: 0 | Status: DISCONTINUED | OUTPATIENT
Start: 2022-06-20 | End: 2022-06-21

## 2022-06-20 RX ORDER — MAGNESIUM HYDROXIDE 400 MG/1
30 TABLET, CHEWABLE ORAL
Refills: 0 | Status: DISCONTINUED | OUTPATIENT
Start: 2022-06-20 | End: 2022-06-22

## 2022-06-20 RX ORDER — DIPHENHYDRAMINE HCL 50 MG
25 CAPSULE ORAL EVERY 6 HOURS
Refills: 0 | Status: DISCONTINUED | OUTPATIENT
Start: 2022-06-20 | End: 2022-06-22

## 2022-06-20 RX ORDER — SODIUM CHLORIDE 9 MG/ML
3 INJECTION INTRAMUSCULAR; INTRAVENOUS; SUBCUTANEOUS EVERY 8 HOURS
Refills: 0 | Status: DISCONTINUED | OUTPATIENT
Start: 2022-06-20 | End: 2022-06-22

## 2022-06-20 RX ORDER — DIBUCAINE 1 %
1 OINTMENT (GRAM) RECTAL EVERY 6 HOURS
Refills: 0 | Status: DISCONTINUED | OUTPATIENT
Start: 2022-06-20 | End: 2022-06-22

## 2022-06-20 RX ADMIN — SODIUM CHLORIDE 125 MILLILITER(S): 9 INJECTION, SOLUTION INTRAVENOUS at 18:32

## 2022-06-20 NOTE — OB RN TRIAGE NOTE - NSICDXPASTMEDICALHX_GEN_ALL_CORE_FT
PAST MEDICAL HISTORY:  BV (bacterial vaginosis)     No pertinent past medical history     Yeast infection      PAST MEDICAL HISTORY:  2019 novel coronavirus disease (COVID-19) 12/2021    BV (bacterial vaginosis)     No pertinent past medical history     Yeast infection

## 2022-06-20 NOTE — OB PROVIDER H&P - NS_TRIAGEMEMBRANE_OBGYN_ALL_OB
RN calling to report possible cellulitis around LE with redness, tenderness, some drainage; no heat at this time.  Patient has allergy to macrodantin only  No CKD noted    Orders:  1. Outline area with sharpie to monitor for growth  2. Keflex 500 mg po QID x 5 days. Dx: cellulitis    Dejah Bermudez, CNP  
Ruptured

## 2022-06-20 NOTE — OB RN TRIAGE NOTE - FALL HARM RISK - UNIVERSAL INTERVENTIONS
Bed in lowest position, wheels locked, appropriate side rails in place/Call bell, personal items and telephone in reach/Instruct patient to call for assistance before getting out of bed or chair/Non-slip footwear when patient is out of bed/[place] to call system/Physically safe environment - no spills, clutter or unnecessary equipment/Purposeful Proactive Rounding/Room/bathroom lighting operational, light cord in reach

## 2022-06-20 NOTE — OB RN PATIENT PROFILE - PRO PATERNAL INTER NEED
"2nd call to patient to schedule MFM consult. Patient reports that she had been "out of the country" and will be leaving on a cruise later this week. Offered several options for MFM consult appointment and patient opted to wait for an appointment on the same day as her f/u visit with Dr. Cardenas.     Appt on 6/27/19 at 840a.    Pt verbalized understanding of information.    "
English

## 2022-06-20 NOTE — OB PROVIDER H&P - ASSESSMENT
27yo female  @ 39.0 wks SLIUP uncomp PNC here with confirmed ROM in labor  -VE-4/80/-2/vtx  -GBS negative  -pt was swabbed for covid-19 and support is vaccination  -pt was dw Dr Loco  -pt approved for epidural  -PNR's were reviewed

## 2022-06-20 NOTE — OB PROVIDER LABOR PROGRESS NOTE - ASSESSMENT
- Pt declining pitocin at this time, prefers to allow natural labor progression without augmentation  - pt comfortable with epidural     Tequila Johnson, PGY2  d/w Dr. Loco 
Plan   cont expt mgmt   patient to notify when feeling urge to push   Rectal temperature obtained given maternal tachycardia, T37.7. Will give 1L LR bolus and cont to monitor   Cont EFM/Jakin     Monet Harris MD PGY3  d/w Dr. Loco

## 2022-06-20 NOTE — OB PROVIDER LABOR PROGRESS NOTE - NS_SUBJECTIVE/OBJECTIVE_OBGYN_ALL_OB_FT
VE to evaluate progress in labor
R3 Labor Note     Patient examined for labor progress. Maternal tachycardia noted while at bedside.

## 2022-06-20 NOTE — OB RN DELIVERY SUMMARY - NSSELHIDDEN_OBGYN_ALL_OB_FT
[NS_DeliveryAttending1_OBGYN_ALL_OB_FT:[id number]==],[NS_DeliveryRN_OBGYN_ALL_OB_FT:[id number]]

## 2022-06-20 NOTE — OB PROVIDER TRIAGE NOTE - HISTORY OF PRESENT ILLNESS
27yo female  @ 39.0 wks SLIUP uncomp PNC here complaining of rupture of membranes, clear at 9am with ctx's Q2-3 min apart.    Pmhx- denies  Pshx/Hosp- DVC  Meds-PNV  Allergies-neosporin  Past ob- TOP x 1 with DVC  Gyn-denies  Soc-denies

## 2022-06-20 NOTE — OB RN PATIENT PROFILE - FALL HARM RISK - UNIVERSAL INTERVENTIONS
Bed in lowest position, wheels locked, appropriate side rails in place/Call bell, personal items and telephone in reach/Instruct patient to call for assistance before getting out of bed or chair/Non-slip footwear when patient is out of bed/[place] to call system/Physically safe environment - no spills, clutter or unnecessary equipment/Purposeful Proactive Rounding/Room/bathroom lighting operational, light cord in reach

## 2022-06-20 NOTE — OB RN DELIVERY SUMMARY - NS_SEPSISRSKCALC_OBGYN_ALL_OB_FT
EOS calculated successfully. EOS Risk Factor: 0.5/1000 live births ([facility] national incidence); GA=39w;Temp=99.68; ROM=14.25; GBS='Negative'; Antibiotics='No antibiotics or any antibiotics < 2 hrs prior to birth'

## 2022-06-20 NOTE — CHART NOTE - NSCHARTNOTEFT_GEN_A_CORE
ob attending note  patient seen and examined at bedside.   comfortable with epidural, srom at 9am  patient moving in bed constantly and FHR tracing is discontinuous    Vital Signs Last 24 Hrs  T(C): 36.7 (20 Jun 2022 15:03), Max: 36.7 (20 Jun 2022 15:03)  T(F): 98.06 (20 Jun 2022 15:03), Max: 98.06 (20 Jun 2022 15:03)  HR: 94 (20 Jun 2022 15:39) (88 - 105)  BP: 130/76 (20 Jun 2022 15:39) (111/73 - 132/78)  RR: 16 (20 Jun 2022 13:45) (16 - 16)  SpO2: 99% (20 Jun 2022 15:36) (99% - 100%)    SVE: 4/70/-3 (unchanged from prior exam)  EFM: FHR cat I  TOCO ctx q 6 min    recommended starting pitocin for augmentation  patient has  at bedside. both refusing pitocin at this time    will re-assess in 3-4 hours    Romeo Loco MD

## 2022-06-20 NOTE — OB PROVIDER TRIAGE NOTE - NSHPPHYSICALEXAM_GEN_ALL_CORE
Gen: A&O x 3; NAD  Vitals; BP-125/83; P-98; T-36.6    Pulm-CTA B/L; no wheezes  Cor-clear S1S2  Abd exam-soft and nontender    VE-4/80/-2; grossly ruptured with clear fluid  NST cat I with 135 baseline with mod variability; ctx's Q2-4 min     GBS negative   EFW~3515  TAS to confirm vtx presentation

## 2022-06-20 NOTE — OB PROVIDER TRIAGE NOTE - NSICDXPASTMEDICALHX_GEN_ALL_CORE_FT
PAST MEDICAL HISTORY:  2019 novel coronavirus disease (COVID-19) 12/2021    BV (bacterial vaginosis)     No pertinent past medical history     Yeast infection

## 2022-06-20 NOTE — OB PROVIDER DELIVERY SUMMARY - NSPROVIDERDELIVERYNOTE_OBGYN_ALL_OB_FT
Spontaneous vaginal delivery of liveborn infant from [place]. Head, shoulders and body delivered easily. Infant was suctioned and handed off to mother/peds. Placenta delivered intact with a 3 vessel cord. Fundal massage was given and uterine fundus was found to be firm. Vaginal exam revealed an intact cervix, vaginal walls and sulci. Second degree laceration was noted, repaired with 2.0 chromic. Excellent hemostasis noted. Patient was stable. Count was correct x2

## 2022-06-20 NOTE — OB PROVIDER DELIVERY SUMMARY - NSSELHIDDEN_OBGYN_ALL_OB_FT
[NS_DeliveryAttending1_OBGYN_ALL_OB_FT:[id number]==],[NS_DeliveryRN_OBGYN_ALL_OB_FT:[id number]]

## 2022-06-21 LAB
COVID-19 SPIKE DOMAIN AB INTERP: POSITIVE
COVID-19 SPIKE DOMAIN ANTIBODY RESULT: 17.8 U/ML — HIGH
SARS-COV-2 IGG+IGM SERPL QL IA: 17.8 U/ML — HIGH
SARS-COV-2 IGG+IGM SERPL QL IA: POSITIVE
T PALLIDUM AB TITR SER: NEGATIVE — SIGNIFICANT CHANGE UP

## 2022-06-21 RX ORDER — ACETAMINOPHEN 500 MG
3 TABLET ORAL
Qty: 0 | Refills: 0 | DISCHARGE
Start: 2022-06-21

## 2022-06-21 RX ORDER — ASCORBIC ACID 60 MG
0 TABLET,CHEWABLE ORAL
Qty: 0 | Refills: 0 | DISCHARGE

## 2022-06-21 RX ORDER — ASCORBIC ACID 60 MG
500 TABLET,CHEWABLE ORAL DAILY
Refills: 0 | Status: DISCONTINUED | OUTPATIENT
Start: 2022-06-21 | End: 2022-06-22

## 2022-06-21 RX ORDER — IBUPROFEN 200 MG
600 TABLET ORAL EVERY 6 HOURS
Refills: 0 | Status: DISCONTINUED | OUTPATIENT
Start: 2022-06-21 | End: 2022-06-22

## 2022-06-21 RX ORDER — FERROUS SULFATE 325(65) MG
325 TABLET ORAL DAILY
Refills: 0 | Status: DISCONTINUED | OUTPATIENT
Start: 2022-06-21 | End: 2022-06-22

## 2022-06-21 RX ORDER — IBUPROFEN 200 MG
1 TABLET ORAL
Qty: 0 | Refills: 0 | DISCHARGE
Start: 2022-06-21

## 2022-06-21 RX ADMIN — Medication 600 MILLIGRAM(S): at 14:30

## 2022-06-21 RX ADMIN — Medication 30 MILLIGRAM(S): at 01:52

## 2022-06-21 RX ADMIN — Medication 975 MILLIGRAM(S): at 21:10

## 2022-06-21 RX ADMIN — Medication 975 MILLIGRAM(S): at 03:00

## 2022-06-21 RX ADMIN — Medication 600 MILLIGRAM(S): at 19:23

## 2022-06-21 RX ADMIN — Medication 600 MILLIGRAM(S): at 18:43

## 2022-06-21 RX ADMIN — Medication 975 MILLIGRAM(S): at 09:50

## 2022-06-21 RX ADMIN — Medication 975 MILLIGRAM(S): at 03:45

## 2022-06-21 RX ADMIN — Medication 975 MILLIGRAM(S): at 08:52

## 2022-06-21 RX ADMIN — Medication 600 MILLIGRAM(S): at 13:36

## 2022-06-21 RX ADMIN — SODIUM CHLORIDE 3 MILLILITER(S): 9 INJECTION INTRAMUSCULAR; INTRAVENOUS; SUBCUTANEOUS at 13:39

## 2022-06-21 RX ADMIN — Medication 30 MILLIGRAM(S): at 02:05

## 2022-06-21 RX ADMIN — Medication 1 TABLET(S): at 13:37

## 2022-06-21 RX ADMIN — SODIUM CHLORIDE 3 MILLILITER(S): 9 INJECTION INTRAMUSCULAR; INTRAVENOUS; SUBCUTANEOUS at 22:00

## 2022-06-21 RX ADMIN — Medication 975 MILLIGRAM(S): at 20:53

## 2022-06-21 RX ADMIN — Medication 325 MILLIGRAM(S): at 13:37

## 2022-06-21 RX ADMIN — Medication 500 MILLIGRAM(S): at 13:37

## 2022-06-21 RX ADMIN — SODIUM CHLORIDE 3 MILLILITER(S): 9 INJECTION INTRAMUSCULAR; INTRAVENOUS; SUBCUTANEOUS at 06:04

## 2022-06-21 NOTE — PROGRESS NOTE ADULT - SUBJECTIVE AND OBJECTIVE BOX
S: Patient doing well. Minimal lochia. Pain controlled.    O: Vital Signs Last 24 Hrs  T(C): 36.4 (2022 14:17), Max: 37.6 (2022 21:15)  T(F): 97.5 (2022 14:17), Max: 99.68 (2022 21:15)  HR: 95 (2022 14:17) (72 - 152)  BP: 118/82 (2022 14:17) (101/66 - 158/106)  BP(mean): --  RR: 18 (2022 14:17) (17 - 18)  SpO2: 100% (2022 14:17) (87% - 100%)    Gen: NAD  Abd: soft, NT, ND, fundus firm below umbilicus  Lochia: moderate  Ext: no tenderness    Labs:                        11.2   10.47 )-----------( 187      ( 2022 14:30 )             34.4       A: 26y PPD# 1 s/p  doing well.    Plan: doing well  for discharge tomorrow

## 2022-06-21 NOTE — DISCHARGE NOTE OB - CARE PROVIDERS DIRECT ADDRESSES
[email or web address]

## 2022-06-21 NOTE — DISCHARGE NOTE OB - MATERIALS PROVIDED
Vaccinations/[facility]  Screening Program/Guide to Postpartum Care/Shaken Baby Prevention Handout/Birth Certificate Instructions

## 2022-06-21 NOTE — DISCHARGE NOTE OB - NS MD DC FALL RISK RISK
For information on Fall & Injury Prevention, visit: [email or web address] OR  [email or web address] OR  https://www.cdc.gov/steadi/patient.html

## 2022-06-21 NOTE — DISCHARGE NOTE OB - PATIENT PORTAL LINK FT
You can access the FollowMyHealth Patient Portal offered by [facility] by registering at the following website: [email or web address]. By joining [employer]’s FollowMyHealth portal, you will also be able to view your health information using other applications (apps) compatible with our system.

## 2022-06-21 NOTE — DISCHARGE NOTE OB - MEDICATION SUMMARY - MEDICATIONS TO TAKE
I will START or STAY ON the medications listed below when I get home from the hospital:    acetaminophen 325 mg oral tablet  -- 3 tab(s) by mouth every 6 hours  -- Indication: For pain    ibuprofen 600 mg oral tablet  -- 1 tab(s) by mouth every 6 hours  -- Indication: For pain   I will START or STAY ON the medications listed below when I get home from the hospital:    acetaminophen 325 mg oral tablet  -- 3 tab(s) by mouth every 6 hours, As Needed  -- Indication: For pain    ibuprofen 600 mg oral tablet  -- 1 tab(s) by mouth every 6 hours, As Needed  -- Indication: For pain    Prenatal Multivitamins with Folic Acid 1 mg oral tablet  -- 1 tab(s) by mouth once a day  -- Indication: For supplement

## 2022-06-21 NOTE — DISCHARGE NOTE OB - HOSPITAL COURSE
Patient status post normal vaginal delivery   Upon discharge, patient is spontaneously voiding, tolerating a regular diet, out of bed, and reports adequate pain control

## 2022-06-21 NOTE — DISCHARGE NOTE OB - CARE PROVIDER_API CALL
Sanjana Escobar (MD)  Obstetrics and Gynecology  [facility] [place] [address]  Phone: [phone]  Fax: [phone]  Follow Up Time:

## 2022-06-22 VITALS
HEART RATE: 90 BPM | SYSTOLIC BLOOD PRESSURE: 132 MMHG | OXYGEN SATURATION: 100 % | RESPIRATION RATE: 18 BRPM | TEMPERATURE: 98 F | DIASTOLIC BLOOD PRESSURE: 82 MMHG

## 2022-06-22 RX ADMIN — Medication 600 MILLIGRAM(S): at 00:37

## 2022-06-22 RX ADMIN — Medication 325 MILLIGRAM(S): at 12:38

## 2022-06-22 RX ADMIN — Medication 500 MILLIGRAM(S): at 12:38

## 2022-06-22 RX ADMIN — Medication 600 MILLIGRAM(S): at 06:04

## 2022-06-22 RX ADMIN — Medication 600 MILLIGRAM(S): at 13:10

## 2022-06-22 RX ADMIN — Medication 600 MILLIGRAM(S): at 12:40

## 2022-06-22 RX ADMIN — SODIUM CHLORIDE 3 MILLILITER(S): 9 INJECTION INTRAMUSCULAR; INTRAVENOUS; SUBCUTANEOUS at 14:00

## 2022-06-22 RX ADMIN — Medication 975 MILLIGRAM(S): at 09:40

## 2022-06-22 RX ADMIN — Medication 975 MILLIGRAM(S): at 10:10

## 2022-06-22 RX ADMIN — Medication 1 TABLET(S): at 12:37

## 2022-06-22 RX ADMIN — Medication 600 MILLIGRAM(S): at 01:00

## 2022-06-23 ENCOUNTER — APPOINTMENT (OUTPATIENT)
Dept: OBGYN | Facility: CLINIC | Age: 27
End: 2022-06-23

## 2022-06-24 ENCOUNTER — NON-APPOINTMENT (OUTPATIENT)
Age: 27
End: 2022-06-24

## 2022-06-27 ENCOUNTER — NON-APPOINTMENT (OUTPATIENT)
Age: 27
End: 2022-06-27

## 2022-07-22 PROBLEM — U07.1 COVID-19: Chronic | Status: ACTIVE | Noted: 2022-06-20

## 2022-08-15 ENCOUNTER — APPOINTMENT (OUTPATIENT)
Dept: OBGYN | Facility: CLINIC | Age: 27
End: 2022-08-15

## 2022-08-15 VITALS
HEART RATE: 78 BPM | WEIGHT: 123 LBS | DIASTOLIC BLOOD PRESSURE: 83 MMHG | SYSTOLIC BLOOD PRESSURE: 124 MMHG | HEIGHT: 58 IN | BODY MASS INDEX: 25.82 KG/M2

## 2022-08-15 DIAGNOSIS — R10.2 OTHER SPECIFIED PREGNANCY RELATED CONDITIONS, UNSPECIFIED TRIMESTER: ICD-10-CM

## 2022-08-15 DIAGNOSIS — Z34.02 ENCOUNTER FOR SUPERVISION OF NORMAL FIRST PREGNANCY, SECOND TRIMESTER: ICD-10-CM

## 2022-08-15 DIAGNOSIS — Z34.03 ENCOUNTER FOR SUPERVISION OF NORMAL FIRST PREGNANCY, THIRD TRIMESTER: ICD-10-CM

## 2022-08-15 DIAGNOSIS — O26.899 OTHER SPECIFIED PREGNANCY RELATED CONDITIONS, UNSPECIFIED TRIMESTER: ICD-10-CM

## 2022-08-15 DIAGNOSIS — Z32.01 ENCOUNTER FOR PREGNANCY TEST, RESULT POSITIVE: ICD-10-CM

## 2022-08-15 PROCEDURE — 0503F POSTPARTUM CARE VISIT: CPT

## 2022-08-15 RX ORDER — METRONIDAZOLE 500 MG/1
500 TABLET ORAL TWICE DAILY
Qty: 14 | Refills: 0 | Status: DISCONTINUED | COMMUNITY
Start: 2022-03-26 | End: 2022-08-15

## 2022-08-15 RX ORDER — TERCONAZOLE 80 MG/1
80 SUPPOSITORY VAGINAL
Qty: 1 | Refills: 0 | Status: DISCONTINUED | COMMUNITY
Start: 2022-04-01 | End: 2022-08-15

## 2022-08-15 RX ORDER — METRONIDAZOLE 7.5 MG/G
0.75 GEL VAGINAL
Qty: 1 | Refills: 0 | Status: DISCONTINUED | COMMUNITY
Start: 2022-03-31 | End: 2022-08-15

## 2022-08-15 RX ORDER — METRONIDAZOLE 7.5 MG/G
0.75 GEL VAGINAL
Qty: 1 | Refills: 0 | Status: DISCONTINUED | COMMUNITY
Start: 2022-03-16 | End: 2022-08-15

## 2022-08-15 RX ORDER — TERCONAZOLE 80 MG/1
80 SUPPOSITORY VAGINAL
Qty: 1 | Refills: 0 | Status: DISCONTINUED | COMMUNITY
Start: 2022-03-31 | End: 2022-08-15

## 2022-08-15 NOTE — HISTORY OF PRESENT ILLNESS
[Postpartum Follow Up] : postpartum follow up [Delivery Date: ___] : on [unfilled] [Female] : Delivery History: baby girl [Vaginal Delivery] : vaginal delivery [Delivery Date Was ___] : delivery date was [unfilled] [Pertussis Vaccine] : Pertussis vaccine administered [Girl] : baby is a girl [Infant's Name ___] : [unfilled] [___ Lbs] : [unfilled] lbs [___ Oz] : [unfilled] oz [Living at Home] : is currently living at home [Breastfeeding] : currently nursing [Intended Contraception] : Intended Contraception: [Complications:___] : no complications [] : delivered by vaginal delivery [Rhogam] : Rhogam was not administered [Rubella Vaccine] : Rubella vaccine was not administered [BTL] : no tubal ligation [Resumed Menses] : has not resumed her menses [Resumed [place]] : has not resumed intercourse [None] : No associated symptoms are reported [Awake] : awake [Alert] : alert [Acute Distress] : no acute distress [Mass] : no breast mass [Nipple Discharge] : no nipple discharge [Axillary LAD] : no axillary lymphadenopathy [Soft] : soft [Tender] : non tender [Distended] : not distended [Oriented x3] : oriented to person, place, and time [Depressed Mood] : not depressed [Flat Affect] : affect not flat [Normal] : external genitalia [Motion Tenderness] : there was no cervical motion tenderness [Tenderness] : nontender [Adnexa Tenderness] : were not tender [Doing Well] : is doing well [No Sign of Infection] : is showing no signs of infection [Excellent Pain Control] : has excellent pain control [FreeTextEntry8] : This 25 yo  presents post vaginal delivery for PPV; feels well, voiding and stooling without issue, considering her contraception options, may use OCP for now, while considers IUD. [de-identified] : Nl PPV [de-identified] : Will Rx Estela- consistent PO intake stressed; Billing Dept tasked for IUD coverage- literature in hand; RTO prn or for annual in 4 months

## 2022-08-22 NOTE — OB RN PATIENT PROFILE - NS_ADMITROM_OBGYN_ALL_OB
With the assistance of a [place] , Message left for patient to call the [facility] at [phone].     Blessing Jimenez RN BSN  [facility]     Yes

## 2022-09-10 LAB
GP B STREP DNA SPEC QL NAA+PROBE: NORMAL
GP B STREP DNA SPEC QL NAA+PROBE: NOT DETECTED
SOURCE GBS: NORMAL

## 2022-09-12 ENCOUNTER — APPOINTMENT (OUTPATIENT)
Dept: OBGYN | Facility: CLINIC | Age: 27
End: 2022-09-12

## 2022-09-12 VITALS
HEART RATE: 77 BPM | BODY MASS INDEX: 26.36 KG/M2 | WEIGHT: 125.6 LBS | HEIGHT: 58 IN | SYSTOLIC BLOOD PRESSURE: 115 MMHG | DIASTOLIC BLOOD PRESSURE: 75 MMHG

## 2022-09-12 DIAGNOSIS — N76.0 ACUTE VAGINITIS: ICD-10-CM

## 2022-09-12 PROCEDURE — 99213 OFFICE O/P EST LOW 20 MIN: CPT

## 2022-09-12 RX ORDER — ESTRADIOL 0.1 MG/G
0.1 CREAM VAGINAL
Qty: 1 | Refills: 1 | Status: ACTIVE | COMMUNITY
Start: 2022-09-12 | End: 1900-01-01

## 2022-09-12 NOTE — PHYSICAL EXAM
[Chaperone Present] : A chaperone was present in the examining room during all aspects of the physical examination [Labia Majora] : normal [Labia Minora] : normal [Normal] : normal [Atrophy] : atrophy [FreeTextEntry4] : minimal amount of granulation tissue at introitus

## 2022-09-14 LAB
CANDIDA VAG CYTO: NOT DETECTED
G VAGINALIS+PREV SP MTYP VAG QL MICRO: NOT DETECTED
T VAGINALIS VAG QL WET PREP: NOT DETECTED

## 2022-11-09 ENCOUNTER — APPOINTMENT (OUTPATIENT)
Dept: OBGYN | Facility: CLINIC | Age: 27
End: 2022-11-09

## 2022-12-14 ENCOUNTER — NON-APPOINTMENT (OUTPATIENT)
Age: 27
End: 2022-12-14

## 2022-12-14 ENCOUNTER — APPOINTMENT (OUTPATIENT)
Dept: OBGYN | Facility: CLINIC | Age: 27
End: 2022-12-14

## 2022-12-14 VITALS — DIASTOLIC BLOOD PRESSURE: 91 MMHG | HEART RATE: 69 BPM | SYSTOLIC BLOOD PRESSURE: 138 MMHG

## 2022-12-14 VITALS
BODY MASS INDEX: 26.45 KG/M2 | HEART RATE: 71 BPM | HEIGHT: 58 IN | SYSTOLIC BLOOD PRESSURE: 145 MMHG | WEIGHT: 126 LBS | DIASTOLIC BLOOD PRESSURE: 98 MMHG

## 2022-12-14 DIAGNOSIS — N90.89 OTHER SPECIFIED NONINFLAMMATORY DISORDERS OF VULVA AND PERINEUM: ICD-10-CM

## 2022-12-14 DIAGNOSIS — R52 OTHER SPECIFIED COMPLICATIONS OF LABOR AND DELIVERY: ICD-10-CM

## 2022-12-14 DIAGNOSIS — Z23 ENCOUNTER FOR IMMUNIZATION: ICD-10-CM

## 2022-12-14 PROCEDURE — 99213 OFFICE O/P EST LOW 20 MIN: CPT

## 2022-12-14 RX ORDER — ESTRADIOL 0.1 MG/G
0.1 CREAM VAGINAL DAILY
Qty: 1 | Refills: 0 | Status: ACTIVE | COMMUNITY
Start: 2022-12-14 | End: 1900-01-01

## 2022-12-14 RX ORDER — MULTIVIT-MIN/IRON/FOLIC ACID/K 18-600-40
CAPSULE ORAL
Refills: 0 | Status: ACTIVE | COMMUNITY

## 2022-12-14 RX ORDER — ACETAMINOPHEN 325 MG
TABLET ORAL
Refills: 0 | Status: ACTIVE | COMMUNITY

## 2022-12-15 NOTE — PHYSICAL EXAM
[Labia Majora] : normal [Labia Minora] : normal [Discharge] : a  ~M vaginal discharge was present [White] : white [Thin] : thin [No Bleeding] : There was no active vaginal bleeding [Normal] : normal [FreeTextEntry1] : minimal amount of granulation tissue at introitus

## 2023-04-25 ENCOUNTER — APPOINTMENT (OUTPATIENT)
Dept: OBGYN | Facility: CLINIC | Age: 28
End: 2023-04-25
Payer: COMMERCIAL

## 2023-04-25 VITALS
HEIGHT: 58 IN | WEIGHT: 131 LBS | SYSTOLIC BLOOD PRESSURE: 126 MMHG | HEART RATE: 70 BPM | BODY MASS INDEX: 27.5 KG/M2 | DIASTOLIC BLOOD PRESSURE: 86 MMHG

## 2023-04-25 DIAGNOSIS — Z01.419 ENCOUNTER FOR GYNECOLOGICAL EXAMINATION (GENERAL) (ROUTINE) W/OUT ABNORMAL FINDINGS: ICD-10-CM

## 2023-04-25 PROCEDURE — 99395 PREV VISIT EST AGE 18-39: CPT

## 2023-04-25 RX ORDER — NORETHINDRONE 0.35 MG/1
0.35 TABLET ORAL DAILY
Qty: 28 | Refills: 8 | Status: ACTIVE | COMMUNITY
Start: 2022-08-15 | End: 1900-01-01

## 2023-05-07 LAB
C TRACH RRNA SPEC QL NAA+PROBE: NOT DETECTED
CYTOLOGY CVX/VAG DOC THIN PREP: NORMAL
N GONORRHOEA RRNA SPEC QL NAA+PROBE: NOT DETECTED
SOURCE AMPLIFICATION: NORMAL

## 2023-05-07 NOTE — HISTORY OF PRESENT ILLNESS
[FreeTextEntry1] : 26 y/o P1LMP 2021 presents for annual gyn visit.  Pt feels well and has no complaints.. Pt is currently breastfeeding. She is not currently using any contraception but is interested in starting it once she stops breast feeding.

## 2023-05-07 NOTE — PLAN
[FreeTextEntry1] : 26 y/o P1 presents for annual gyn visit.\par \par Pap performed today\par GC testing done today\par RTO 1 year for annual

## 2023-05-30 NOTE — OB PROVIDER TRIAGE NOTE - NS_OBGYNHISTORY_OBGYN_ALL_OB_FT
TOP x 1 Prednisone Pregnancy And Lactation Text: This medication is Pregnancy Category C and it isn't know if it is safe during pregnancy. This medication is excreted in breast milk.

## 2023-08-09 NOTE — DISCHARGE NOTE OB - BREAST MILK PROVIDES PROTECTION AGAINST INFECTION
H&P reviewed. The patient was examined and there are no changes to the H&P.         Statement Selected

## 2023-08-15 ENCOUNTER — APPOINTMENT (OUTPATIENT)
Dept: OBGYN | Facility: CLINIC | Age: 28
End: 2023-08-15
Payer: COMMERCIAL

## 2023-08-15 VITALS
HEIGHT: 58 IN | WEIGHT: 133 LBS | SYSTOLIC BLOOD PRESSURE: 124 MMHG | HEART RATE: 74 BPM | DIASTOLIC BLOOD PRESSURE: 80 MMHG | BODY MASS INDEX: 27.92 KG/M2

## 2023-08-15 DIAGNOSIS — N94.9 UNSPECIFIED CONDITION ASSOCIATED WITH FEMALE GENITAL ORGANS AND MENSTRUAL CYCLE: ICD-10-CM

## 2023-08-15 DIAGNOSIS — N89.8 OTHER SPECIFIED NONINFLAMMATORY DISORDERS OF VAGINA: ICD-10-CM

## 2023-08-15 PROCEDURE — 99214 OFFICE O/P EST MOD 30 MIN: CPT

## 2023-08-15 RX ORDER — LIDOCAINE HYDROCHLORIDE 30 MG/G
3 CREAM TOPICAL
Qty: 1 | Refills: 1 | Status: ACTIVE | COMMUNITY
Start: 2023-08-15 | End: 1900-01-01

## 2023-08-17 RX ORDER — METRONIDAZOLE 500 MG/1
500 TABLET ORAL
Qty: 14 | Refills: 0 | Status: ACTIVE | COMMUNITY
Start: 2023-08-17 | End: 1900-01-01

## 2024-02-02 ENCOUNTER — RX RENEWAL (OUTPATIENT)
Age: 29
End: 2024-02-02

## 2024-06-04 RX ORDER — NORETHINDRONE 0.35 MG/1
0.35 TABLET ORAL
Qty: 28 | Refills: 5 | Status: ACTIVE | COMMUNITY
Start: 2024-02-02 | End: 1900-01-01

## 2024-09-04 ENCOUNTER — APPOINTMENT (OUTPATIENT)
Dept: OBGYN | Facility: CLINIC | Age: 29
End: 2024-09-04

## 2024-10-09 ENCOUNTER — NON-APPOINTMENT (OUTPATIENT)
Age: 29
End: 2024-10-09

## 2024-10-09 ENCOUNTER — APPOINTMENT (OUTPATIENT)
Dept: OBGYN | Facility: CLINIC | Age: 29
End: 2024-10-09

## 2024-10-09 VITALS
HEART RATE: 80 BPM | HEIGHT: 58 IN | BODY MASS INDEX: 28.34 KG/M2 | DIASTOLIC BLOOD PRESSURE: 79 MMHG | WEIGHT: 135 LBS | SYSTOLIC BLOOD PRESSURE: 121 MMHG

## 2024-10-09 DIAGNOSIS — N94.10 UNSPECIFIED DYSPAREUNIA: ICD-10-CM

## 2024-10-09 DIAGNOSIS — N90.89 OTHER SPECIFIED NONINFLAMMATORY DISORDERS OF VULVA AND PERINEUM: ICD-10-CM

## 2024-10-09 PROCEDURE — 99459 PELVIC EXAMINATION: CPT

## 2024-10-09 PROCEDURE — 99214 OFFICE O/P EST MOD 30 MIN: CPT

## 2024-10-09 RX ORDER — METRONIDAZOLE 500 MG/1
500 TABLET ORAL TWICE DAILY
Qty: 14 | Refills: 0 | Status: ACTIVE | COMMUNITY
Start: 2024-10-09 | End: 1900-01-01

## 2024-11-01 NOTE — OB RN DELIVERY SUMMARY - BABY A: APGAR 5 MIN MUSCLE TONE, DELIVERY
Addended by: QUEENIE THOMSON on: 11/1/2024 11:39 AM     Modules accepted: Orders     (2) well flexed

## 2024-12-04 ENCOUNTER — APPOINTMENT (OUTPATIENT)
Dept: OBGYN | Facility: CLINIC | Age: 29
End: 2024-12-04